# Patient Record
Sex: FEMALE | Race: WHITE
[De-identification: names, ages, dates, MRNs, and addresses within clinical notes are randomized per-mention and may not be internally consistent; named-entity substitution may affect disease eponyms.]

---

## 2017-03-20 ENCOUNTER — HOSPITAL ENCOUNTER (EMERGENCY)
Dept: HOSPITAL 17 - NEPE | Age: 32
LOS: 1 days | Discharge: HOME | End: 2017-03-21
Payer: MEDICAID

## 2017-03-20 VITALS — HEIGHT: 62 IN | WEIGHT: 99.21 LBS | BODY MASS INDEX: 18.26 KG/M2

## 2017-03-20 VITALS
RESPIRATION RATE: 16 BRPM | DIASTOLIC BLOOD PRESSURE: 58 MMHG | TEMPERATURE: 97.4 F | OXYGEN SATURATION: 99 % | HEART RATE: 62 BPM | SYSTOLIC BLOOD PRESSURE: 97 MMHG

## 2017-03-20 VITALS — TEMPERATURE: 97.7 F

## 2017-03-20 DIAGNOSIS — R51: ICD-10-CM

## 2017-03-20 DIAGNOSIS — N76.0: Primary | ICD-10-CM

## 2017-03-20 DIAGNOSIS — R11.0: ICD-10-CM

## 2017-03-20 LAB
COLOR UR: YELLOW
COMMENT (UR): (no result)
CULTURE IF INDICATED: (no result)
GLUCOSE UR STRIP-MCNC: (no result) MG/DL
HGB UR QL STRIP: (no result)
KETONES UR STRIP-MCNC: (no result) MG/DL
MUCOUS THREADS #/AREA URNS LPF: (no result) /LPF
NITRITE UR QL STRIP: (no result)
RENAL EPI CELLS #/AREA URNS HPF: <1 /HPF
SP GR UR STRIP: 1.02 (ref 1–1.03)
SQUAMOUS #/AREA URNS HPF: 2 /HPF (ref 0–5)

## 2017-03-20 PROCEDURE — 87210 SMEAR WET MOUNT SALINE/INK: CPT

## 2017-03-20 PROCEDURE — 81001 URINALYSIS AUTO W/SCOPE: CPT

## 2017-03-20 PROCEDURE — 99283 EMERGENCY DEPT VISIT LOW MDM: CPT

## 2017-03-20 PROCEDURE — 87591 N.GONORRHOEAE DNA AMP PROB: CPT

## 2017-03-20 PROCEDURE — 87491 CHLMYD TRACH DNA AMP PROBE: CPT

## 2017-03-20 PROCEDURE — 84703 CHORIONIC GONADOTROPIN ASSAY: CPT

## 2017-03-20 PROCEDURE — 87804 INFLUENZA ASSAY W/OPTIC: CPT

## 2017-03-21 LAB
CHLAMYDIA PCR: NOT DETECTED
NEISSERIA PCR: NOT DETECTED

## 2017-03-21 NOTE — PD
HPI


Chief Complaint:   Complaint


Time Seen by Provider:  23:31


Travel History


International Travel<30 days:  No


Contact w/Intl Traveler<30days:  No


Traveled to known affect area:  No





History of Present Illness


HPI


Patient's 31 years old.  She is here due to urinary frequency and dysuria.  She 

reports nausea and headaches for the past 2 days.  She states she believes she 

has UTI.  She denies fever.  She denies diarrhea.  She's had no abnormal 

vaginal discharge or bleeding.  Last menstruation was 1 month prior.





PFSH


Past Medical History


Hx Anticoagulant Therapy:  No


Anxiety:  Yes


Depression:  Yes


Cardiovascular Problems:  No


Chemotherapy:  No


Cerebrovascular Accident:  No


Diabetes:  No


Diminished Hearing:  No


Respiratory:  No


Immunizations Current:  No


Pregnant?:  Not Pregnant


LMP:  BREASTFEEDING


:  2


Para:  1


Miscarriage:  0


:  1





Past Surgical History


Hysterectomy:  No





Social History


Alcohol Use:  No


Tobacco Use:  No


Substance Use:  No





Allergies-Medications


(Allergen,Severity, Reaction):  


Coded Allergies:  


     Cephalexin (Verified  Allergy, Severe, RAPID HEART RATE, 3/20/17)


     Flagyl (Verified  Allergy, Severe, THROAT SWELLS, 3/20/17)


     Amoxicillin (Verified  Adverse Reaction, Mild, Tachycardia, 3/20/17)


Reported Meds & Prescriptions





Reported Meds & Active Scripts


Active


No Active Prescriptions or Reported Medications    








Review of Systems


Except as stated in HPI:  all other systems reviewed are Neg





Physical Exam


Narrative


GENERAL: 31-year-old female pleasant


GENITOURINARY: Vulva normal.  No adnexal mass or tenderness.  Minimal discharge 

in vault.  No significant CMT


SKIN: Warm and dry.


HEAD: Atraumatic. Normocephalic. 


EYES: Pupils equal and round. No scleral icterus. No injection or drainage. 


ENT: No nasal bleeding or discharge.  Mucous membranes pink and moist.


NECK: Trachea midline. No JVD. 


CARDIOVASCULAR: Regular rate and rhythm.  


RESPIRATORY: No accessory muscle use. Clear to auscultation. Breath sounds 

equal bilaterally. 


GASTROINTESTINAL: Abdomen soft, non-tender, nondistended. Hepatic and splenic 

margins not palpable. 


MUSCULOSKELETAL: Extremities without clubbing, cyanosis, or edema. No obvious 

deformities. 


NEUROLOGICAL: Awake and alert. No obvious cranial nerve deficits.  Motor 

grossly within normal limits. Five out of 5 muscle strength in the arms and 

legs.  Normal speech.


PSYCHIATRIC: Appropriate mood and affect; insight and judgment normal.





Data


Data


Last Documented VS





Vital Signs








  Date Time  Temp Pulse Resp B/P Pulse Ox O2 Delivery O2 Flow Rate FiO2


 


3/20/17 23:37 97.7       


 


3/20/17 20:40  62 16 97/58 99   





Vital signs reviewed


Orders





 Urinalysis - C+S If Indicated (3/20/17 23:32)


Influenzae A/B Antigen (3/20/17 23:41)


Ed Urine Pregnancytest Poc (3/20/17 23:41)


Gc And Chlamydia Pcr (3/20/17 23:54)


Wet Prep Profile (3/20/17 23:54)





Labs





 Laboratory Tests








Test 3/20/17 3/21/17





 23:30 00:20


 


Urine Color YELLOW  


 


Urine Turbidity HAZY  


 


Urine pH 6.5  


 


Urine Specific Gravity 1.021  


 


Urine Protein TRACE mg/dL 


 


Urine Glucose (UA) NEG mg/dL 


 


Urine Ketones NEG mg/dL 


 


Urine Occult Blood NEG  


 


Urine Nitrite NEG  


 


Urine Bilirubin NEG  


 


Urine Urobilinogen LESS THAN 2.0 





 MG/DL 


 


Urine Leukocyte Esterase TRACE  


 


Urine RBC 1 /hpf 


 


Urine WBC 4 /hpf 


 


Urine Squamous Epithelial 2 /hpf 





Cells  


 


Urine Renal Epithelial Cells <1 /hpf 


 


Urine Amorphous Sediment RARE  


 


Urine Mucus FEW /lpf 


 


Microscopic Urinalysis Comment CULT NOT 





 INDICATED 


 


Clue Cells (Wet Prep)  PRESENT 


 


Vaginal Trichomonas (Wet Prep)  NONE SEEN 


 


Vaginal Yeast (Wet Prep)  NONE SEEN 











MDM


Medical Decision Making


Medical Screen Exam Complete:  Yes


Emergency Medical Condition:  Yes


Medical Record Reviewed:  Yes


Differential Diagnosis


IUP, UTI, ectopic pregnancy, ov torsion, appendicitis, TOA, cervicitis, BV, 

Trichomoniasis, ov cyst, hernia, mittelschmerz, pain from menstruation


Narrative Course


Wet prep reveals bacterial vaginosis.


The patient will receive Flagyl.


She is ready for discharge.





Diagnosis





 Primary Impression:  


 Bacterial vaginosis


Referrals:  


Primary Care Physician


2 days





***Additional Instructions:


You have a choice when it comes to health care, and we are glad that you chose 

[facility]. Hopefully, we have met your expectations on today's visit.  You 

are welcome to return to [facility] at any time, as we are committed to 

meeting the health care needs of our community.


***Med/Other Pt SpecificInfo:  Prescription(s) given


Scripts


Clindamycin Vaginal Cream 2% Cream1 Appl VAGINAL HS  #7 APPL  Ref 0


   Prov:Orestes Manuel MD         3/21/17


Disposition:  01 DISCHARGE HOME


Condition:  Stable








Orestes Manuel MD Mar 21, 2017 01:24

## 2018-04-19 ENCOUNTER — HOSPITAL ENCOUNTER (EMERGENCY)
Dept: HOSPITAL 17 - NEPD | Age: 33
Discharge: HOME | End: 2018-04-19
Payer: MEDICAID

## 2018-04-19 VITALS — WEIGHT: 105.82 LBS | BODY MASS INDEX: 19.47 KG/M2 | HEIGHT: 62 IN

## 2018-04-19 VITALS
DIASTOLIC BLOOD PRESSURE: 57 MMHG | TEMPERATURE: 98.5 F | SYSTOLIC BLOOD PRESSURE: 121 MMHG | OXYGEN SATURATION: 98 % | HEART RATE: 81 BPM | RESPIRATION RATE: 20 BRPM

## 2018-04-19 DIAGNOSIS — R30.0: ICD-10-CM

## 2018-04-19 DIAGNOSIS — B37.3: Primary | ICD-10-CM

## 2018-04-19 LAB
COLOR UR: YELLOW
GLUCOSE UR STRIP-MCNC: (no result) MG/DL
HGB UR QL STRIP: (no result)
KETONES UR STRIP-MCNC: (no result) MG/DL
MUCOUS THREADS #/AREA URNS LPF: (no result) /LPF
NITRITE UR QL STRIP: (no result)
SP GR UR STRIP: 1.03 (ref 1–1.03)
SQUAMOUS #/AREA URNS HPF: 4 /HPF (ref 0–5)
URINE LEUKOCYTE ESTERASE: (no result)

## 2018-04-19 PROCEDURE — 87591 N.GONORRHOEAE DNA AMP PROB: CPT

## 2018-04-19 PROCEDURE — 81001 URINALYSIS AUTO W/SCOPE: CPT

## 2018-04-19 PROCEDURE — 87491 CHLMYD TRACH DNA AMP PROBE: CPT

## 2018-04-19 PROCEDURE — 99283 EMERGENCY DEPT VISIT LOW MDM: CPT

## 2018-04-19 NOTE — PD
HPI


Chief Complaint:  Gyn Problem/Complaint


Time Seen by Provider:  15:40


Travel History


International Travel<30 days:  No


Contact w/Intl Traveler<30days:  No


Traveled to known affect area:  No





History of Present Illness


HPI


Patient comes in complaining of burning on urination, and white discharge her 

vaginal area.  She denies having any lumps or any blisters or anything like 

that on her external vulvar region.  States this has been going on for about a 

week, she has been using cranberry juice and AZO over-the-counter but no 

improvement.  Patient denies any alleviating or aggravating factors.  Patient 

states that she has not had sex in a year.





States allergy to amoxicillin and Keflex and Flagyl





PFSH


Past Medical History


Hx Anticoagulant Therapy:  No


Anxiety:  Yes


Depression:  Yes


Cardiovascular Problems:  No


Chemotherapy:  No


Cerebrovascular Accident:  No


Diabetes:  No


Diminished Hearing:  No


Respiratory:  No


Immunizations Current:  No


Pregnant?:  Not Pregnant


LMP:  4--18


:  2


Para:  1


Miscarriage:  0


:  1





Past Surgical History


Hysterectomy:  No





Social History


Alcohol Use:  No


Tobacco Use:  No


Substance Use:  No





Allergies-Medications


(Allergen,Severity, Reaction):  


Coded Allergies:  


     cephalexin (Unverified  Allergy, Severe, RAPID HEART RATE, 18)


     metronidazole (Unverified  Allergy, Severe, THROAT SWELLS, 18)


     amoxicillin (Unverified  Adverse Reaction, Mild, Tachycardia, 18)


Reported Meds & Prescriptions





Reported Meds & Active Scripts


Active


Clindamycin Vaginal Cream (Clindamycin Phosphate) 2% Cream 1 Appl VAGINAL HS








Review of Systems


General / Constitutional:  No: Fever


Eyes:  No: Visual changes


HENT:  No: Headaches


Cardiovascular:  No: Chest Pain or Discomfort


Respiratory:  No: Shortness of Breath


Gastrointestinal:  No: Abdominal Pain


Genitourinary:  Positive: Dysuria, Discharge


Musculoskeletal:  No: Pain


Skin:  No Rash


Neurologic:  No: Weakness


Psychiatric:  No: Depression


Endocrine:  No: Polydipsia


Hematologic/Lymphatic:  No: Easy Bruising





Physical Exam


Narrative


GENERAL: 


SKIN: Warm and dry.


HEAD: Atraumatic. Normocephalic. 


EYES: Pupils equal and round. No scleral icterus. No injection or drainage. 


ENT: No nasal bleeding or discharge.  Mucous membranes pink and moist.


NECK: Trachea midline. No JVD. 


CARDIOVASCULAR: Regular rate and rhythm.  


RESPIRATORY: No accessory muscle use. Clear to auscultation. Breath sounds 

equal bilaterally. 


GASTROINTESTINAL: Abdomen soft, non-tender, nondistended.  OREN Guillen at bedside 

assisting: There are no lesions on the vulvar region, cervix was erythematous 

and thick white discharge was noted surrounding the walls as well as around 

cervix.  Swabs were obtained and sent for culture however clinically it appears 

to be candidiasis


MUSCULOSKELETAL: Extremities without clubbing, cyanosis, or edema. No obvious 

deformities. 


NEUROLOGICAL: Awake and alert. No obvious cranial nerve deficits.  Motor 

grossly within normal limits. Five out of 5 muscle strength in the arms and 

legs.  Normal speech.


PSYCHIATRIC: Appropriate mood and affect; insight and judgment normal.





Data


Data


Last Documented VS





Vital Signs








  Date Time  Temp Pulse Resp B/P (MAP) Pulse Ox O2 Delivery O2 Flow Rate FiO2


 


18 13:40 98.5 81 20 121/57 (78) 98   








Orders





 Orders


Urinalysis - C+S If Indicated (18 13:43)





Labs





Laboratory Tests








Test


  18


13:55


 


Urine Color YELLOW 


 


Urine Turbidity CLEAR 


 


Urine pH 5.5 


 


Urine Specific Gravity 1.032 


 


Urine Protein TRACE mg/dL 


 


Urine Glucose (UA) NEG mg/dL 


 


Urine Ketones NEG mg/dL 


 


Urine Occult Blood NEG 


 


Urine Nitrite NEG 


 


Urine Bilirubin NEG 


 


Urine Urobilinogen 2.0 MG/DL 


 


Urine Leukocyte Esterase NEG 


 


Urine WBC


  LESS THAN 1


/hpf


 


Urine Squamous Epithelial


Cells 4 /hpf 


 


 


Urine Mucus FEW /lpf 


 


Microscopic Urinalysis Comment


  CULT NOT


INDICATED











MDM


Medical Decision Making


Medical Screen Exam Complete:  Yes


Emergency Medical Condition:  Yes


Medical Record Reviewed:  Yes


Differential Diagnosis


UTI versus pregnancy related versus STD


Narrative Course


After evaluation patient's UA is negative for any UTI


Pregnancy test is negative


Upon pelvic examination, the patient's discharge seems most consistent with 

candidiasis, swabs were sent to test for GC chlamydia and trichomonas as well.





Diagnosis





 Primary Impression:  


 Vaginal candidiasis


Patient Instructions:  General Instructions


Scripts


Clotrimazole Vaginal (Gyne-Lotrimin 3 Vaginal) 2% Cream


1 APPL VAGINAL HS for Fungal Infection, #3 GM 0 Refills


   Prov: Sanchez Lutz MD         18 


Fluconazole (Fluconazole) 150 Mg Tab


150 MG PO ONCE for Infection, #1 TAB 0 Refills


   Prov: Sanchez Lutz MD         18


Disposition:  01 DISCHARGE HOME


Condition:  Stable











Sanchez Lutz MD 2018 16:01